# Patient Record
Sex: MALE
[De-identification: names, ages, dates, MRNs, and addresses within clinical notes are randomized per-mention and may not be internally consistent; named-entity substitution may affect disease eponyms.]

---

## 2018-08-01 ENCOUNTER — HOSPITAL (OUTPATIENT)
Dept: OTHER | Age: 83
End: 2018-08-01
Attending: OTOLARYNGOLOGY

## 2021-07-01 ENCOUNTER — ORDER TRANSCRIPTION (OUTPATIENT)
Dept: ADMINISTRATIVE | Facility: HOSPITAL | Age: 86
End: 2021-07-01

## 2021-07-01 ENCOUNTER — ORDER TRANSCRIPTION (OUTPATIENT)
Dept: PHYSICAL THERAPY | Facility: HOSPITAL | Age: 86
End: 2021-07-01

## 2021-07-01 DIAGNOSIS — Z11.59 ENCOUNTER FOR SCREENING FOR OTHER VIRAL DISEASES: ICD-10-CM

## 2021-07-01 DIAGNOSIS — Z01.818 PREOP EXAMINATION: Primary | ICD-10-CM

## 2021-07-01 DIAGNOSIS — R26.2 DIFFICULTY WALKING: Primary | ICD-10-CM

## 2021-07-17 ENCOUNTER — LAB ENCOUNTER (OUTPATIENT)
Dept: LAB | Facility: HOSPITAL | Age: 86
End: 2021-07-17
Attending: SPECIALIST
Payer: COMMERCIAL

## 2021-07-17 DIAGNOSIS — Z11.59 ENCOUNTER FOR SCREENING FOR OTHER VIRAL DISEASES: ICD-10-CM

## 2021-07-17 DIAGNOSIS — Z01.818 PREOP EXAMINATION: ICD-10-CM

## 2021-07-17 LAB — SARS-COV-2 RNA RESP QL NAA+PROBE: NOT DETECTED

## 2021-07-20 ENCOUNTER — HOSPITAL ENCOUNTER (OUTPATIENT)
Dept: GENERAL RADIOLOGY | Facility: HOSPITAL | Age: 86
Discharge: HOME | End: 2021-07-20
Attending: SPECIALIST
Payer: COMMERCIAL

## 2021-07-20 DIAGNOSIS — R13.13 PHARYNGEAL DYSPHAGIA: ICD-10-CM

## 2021-07-20 DIAGNOSIS — R13.10 DIFFICULTY SWALLOWING: ICD-10-CM

## 2021-07-20 PROCEDURE — 92611 MOTION FLUOROSCOPY/SWALLOW: CPT

## 2021-07-20 PROCEDURE — 74230 X-RAY XM SWLNG FUNCJ C+: CPT | Performed by: SPECIALIST

## 2021-07-20 NOTE — PROGRESS NOTES
ADULT VIDEOFLUOROSCOPIC SWALLOWING STUDY       ADULT VIDEOFLUOROSCOPIC SWALLOWING STUDY:   Referring Physician: Nick Simons      Radiologist: Dr. Bisi Finnegan  Diagnosis: dysphagia    Date of Service: 7/20/2021     PATIENT SUMMARY   Chief Complaint: The patient the swallow and episodes of aspiration occurred after the swallow on retention remaining.   The patient elicited a cough and throat clear inconsistently to the penetration and consistently with the aspiration, however, he was unable to eject the material fr strategies:   Sit upright  Small sips  Small bites  Eat slowly  Alternate liquids and solids  Swallow twice with each bite  Multiple dry swallows  Cough after swallow  No straw    Medication Administration:  Crush meds & add to pureed  Take whole in pureed signed by therapist: MILAN Martinez  [de-identified] certification required:  Yes  I certify the need for these services furnished under this plan of treatment and while under my care.     X___________________________________________________ Date______

## 2021-07-20 NOTE — PATIENT INSTRUCTIONS
DDiet Recommendations:  Solids: Pureed  Liquids: Honey  Pt may have small sips of plain water after cleaning his mouth thoroughly between meals, but not during meals.   Recommend trial dysphagia therapy.     Recommended compensatory strategies:   Sit Lico Crafts

## 2021-08-05 ENCOUNTER — APPOINTMENT (OUTPATIENT)
Dept: PHYSICAL THERAPY | Facility: HOSPITAL | Age: 86
End: 2021-08-05
Payer: COMMERCIAL

## 2021-08-10 ENCOUNTER — APPOINTMENT (OUTPATIENT)
Dept: PHYSICAL THERAPY | Facility: HOSPITAL | Age: 86
End: 2021-08-10
Payer: COMMERCIAL

## 2021-08-12 ENCOUNTER — APPOINTMENT (OUTPATIENT)
Dept: PHYSICAL THERAPY | Facility: HOSPITAL | Age: 86
End: 2021-08-12
Payer: COMMERCIAL

## 2021-08-16 ENCOUNTER — OFFICE VISIT (OUTPATIENT)
Dept: PHYSICAL THERAPY | Facility: HOSPITAL | Age: 86
End: 2021-08-16
Payer: COMMERCIAL

## 2021-08-16 DIAGNOSIS — R26.2 DIFFICULTY WALKING: ICD-10-CM

## 2021-08-16 PROCEDURE — 97163 PT EVAL HIGH COMPLEX 45 MIN: CPT

## 2021-08-16 PROCEDURE — 97110 THERAPEUTIC EXERCISES: CPT

## 2021-08-16 NOTE — PROGRESS NOTES
LOWER EXTREMITY EVALUATION:   Referring Physician: Dr. Fernando Valdez (fax [phone])  Diagnosis: Difficulty walking (R26.2)     Date of Service: 8/16/2021     PATIENT SUMMARY   Bran Hsieh is a 82year old male who presents to therapy today with complai Significant findings include No past medical history on file. No past surgical history on file. Stent placed: two iliac stents, aortic stent, done 2-3 years ago. On BP meds and HLD meds. No MI or CVA. Borderline DM unmedicated but monitored.  3rd and 4 seated assessment of lumbar PA globally, restricted anterior glide of knee      Flexibility:  Hip Flexor: R strong restriction, L strong restriction  Hamstrings: R strong restriction; L strong restriction  Gastroc-soleus: R strong restriction; L strong res mechanics to decrease pain and improve spinal safety   · Pt will have decreased paraspinal mm tension to tolerate standing for work and home activities   · Patient will improve 6MWT to 417m without rest breaks.     · Patient will demonstrate 5xSTS no greate

## 2021-08-18 ENCOUNTER — OFFICE VISIT (OUTPATIENT)
Dept: PHYSICAL THERAPY | Facility: HOSPITAL | Age: 86
End: 2021-08-18
Payer: COMMERCIAL

## 2021-08-18 DIAGNOSIS — R26.2 DIFFICULTY WALKING: ICD-10-CM

## 2021-08-18 PROCEDURE — 97110 THERAPEUTIC EXERCISES: CPT

## 2021-08-18 NOTE — PROGRESS NOTES
Dx: Difficulty walking (R26.2)         Insurance (Authorized # of Visits):  [facility] 2/10           Authorizing Physician: Dr. Evelyne Schwartz  Next MD visit: none scheduled  Fall Risk: high         Precautions: Cancer, Fall Risk, iliac and aortic stents, borderl 1-2 x/week or a total of 10 visits over a 90 day period.  Treatment will include: Gait training, Manual Therapy, Neuromuscular Re-education, Therapeutic Activities, Therapeutic Exercise and Home Exercise Program instruction  Date: 8/18/2021  TX#: 2/10 Date:

## 2021-08-23 ENCOUNTER — OFFICE VISIT (OUTPATIENT)
Dept: PHYSICAL THERAPY | Facility: HOSPITAL | Age: 86
End: 2021-08-23
Payer: COMMERCIAL

## 2021-08-23 DIAGNOSIS — R26.2 DIFFICULTY WALKING: ICD-10-CM

## 2021-08-23 PROCEDURE — 97112 NEUROMUSCULAR REEDUCATION: CPT

## 2021-08-23 PROCEDURE — 97110 THERAPEUTIC EXERCISES: CPT

## 2021-08-23 NOTE — PROGRESS NOTES
Dx: Difficulty walking (R26.2)         Insurance (Authorized # of Visits):  [employer] [address] [employer] PPO 3/10           Authorizing Physician: Dr. Meggan Bedolla  Next MD visit: none scheduled  Fall Risk: high         Precautions: Cancer, Fall Risk, iliac and aortic stents, borderl 90 day period.  Treatment will include: Gait training, Manual Therapy, Neuromuscular Re-education, Therapeutic Activities, Therapeutic Exercise and Home Exercise Program instruction  Date: 8/18/2021  TX#: 2/10 Date:  8/23/2021                TX#: 3/10 Date:

## 2021-08-25 ENCOUNTER — OFFICE VISIT (OUTPATIENT)
Dept: PHYSICAL THERAPY | Facility: HOSPITAL | Age: 86
End: 2021-08-25
Payer: COMMERCIAL

## 2021-08-25 DIAGNOSIS — R26.2 DIFFICULTY WALKING: ICD-10-CM

## 2021-08-25 PROCEDURE — 97110 THERAPEUTIC EXERCISES: CPT

## 2021-08-25 NOTE — PROGRESS NOTES
Dx: Difficulty walking (R26.2)         Insurance (Authorized # of Visits):  [employer] PPO 4/10           Authorizing Physician: Dr. Jody Montana  Next MD visit: none scheduled  Fall Risk: high         Precautions: Cancer, Fall Risk, iliac and aortic stents, borderlin improved lower quarter strength to ascend one flight of stairs with alternating foot pattern or reciprocal pattern.    · Pt will be independent and compliant with comprehensive HEP to maintain progress achieved in PT     Plan: progress functional strength a

## 2021-08-26 ENCOUNTER — TELEPHONE (OUTPATIENT)
Dept: PHYSICAL THERAPY | Facility: HOSPITAL | Age: 86
End: 2021-08-26

## 2021-08-30 ENCOUNTER — OFFICE VISIT (OUTPATIENT)
Dept: PHYSICAL THERAPY | Facility: HOSPITAL | Age: 86
End: 2021-08-30
Payer: COMMERCIAL

## 2021-08-30 DIAGNOSIS — R26.2 DIFFICULTY WALKING: ICD-10-CM

## 2021-08-30 NOTE — PROGRESS NOTES
Dx: Difficulty walking (R26.2)         Insurance (Authorized # of Visits):  [facility] 4/10           Authorizing Physician: Dr. Melissa Westfall  Next MD visit: none scheduled  Fall Risk: high         Precautions: Cancer, Fall Risk, iliac and aortic stents, borderl

## 2021-09-01 ENCOUNTER — OFFICE VISIT (OUTPATIENT)
Dept: SPEECH THERAPY | Facility: HOSPITAL | Age: 86
End: 2021-09-01
Attending: SPECIALIST
Payer: COMMERCIAL

## 2021-09-01 PROCEDURE — 92526 ORAL FUNCTION THERAPY: CPT

## 2021-09-01 NOTE — PROGRESS NOTES
Diagnosis: dysphagia  Authorized # of Visits:  6         Next MD visit: none scheduled  Fall Risk: standard         Precautions: Covid PPE             Subjective: Pt is not eating pureed food nor drinking moderately thick liquids as recommended.   He is eat Provided in written form, with emphasis on need for honey thick liquid unless pt cleans mouth thoroughly then he is able to rake small single sips of plain water. Encouraged cough after all PO.         Clinical signs of aspiration Pt cued to cough after ea swallow  Mendelsohn exercises  Shaker exercises  Plan: Continue therapy per specified goals.       Skilled Services: dysphagia therapy    Charges: 17422        Total Treatment Time: 54 min    Miranda Gaviria MA/CCC-SLP  Speech Language Pathologist  [facility]

## 2021-09-03 ENCOUNTER — TELEPHONE (OUTPATIENT)
Dept: PHYSICAL THERAPY | Facility: HOSPITAL | Age: 86
End: 2021-09-03

## 2021-09-08 ENCOUNTER — TELEPHONE (OUTPATIENT)
Dept: SPEECH THERAPY | Facility: HOSPITAL | Age: 86
End: 2021-09-08

## 2021-09-08 ENCOUNTER — APPOINTMENT (OUTPATIENT)
Dept: SPEECH THERAPY | Facility: HOSPITAL | Age: 86
End: 2021-09-08
Attending: SPECIALIST
Payer: COMMERCIAL

## 2021-09-08 NOTE — TELEPHONE ENCOUNTER
Pt cancelled all of his appointments because he said he was going to [place] for the winter. Attempted to call patient back (called number listed)  to encourage him to seek swallowing therapy in [place] as his dysphagia is significant.   His wife answered

## 2021-09-08 NOTE — TELEPHONE ENCOUNTER
Wife stopped into clinic. Briefly reviewed results of VFSS, diet and liquids recommendations and therapy recommendations.   Bernadette Casanova MA/[facility]-SLP  Speech Language Pathologist  [facility]  [phone]

## 2021-09-09 ENCOUNTER — OFFICE VISIT (OUTPATIENT)
Dept: PHYSICAL THERAPY | Facility: HOSPITAL | Age: 86
End: 2021-09-09
Attending: ORTHOPAEDIC SURGERY
Payer: COMMERCIAL

## 2021-09-09 PROCEDURE — 97110 THERAPEUTIC EXERCISES: CPT

## 2021-09-09 NOTE — PROGRESS NOTES
Dx: Difficulty walking (R26.2)         Insurance (Authorized # of Visits):  [facility] 8/11           Authorizing Physician: Dr. July Rush  Next MD visit: none scheduled  Fall Risk: high         Precautions: Cancer, Fall Risk, iliac and aortic stents, borderlin functional strength and gait impairments, deconditioning  Patient will be seen for 1-2 x/week or a total of 10 visits over a 90 day period.  Treatment will include: Gait training, Manual Therapy, Neuromuscular Re-education, Therapeutic Activities, Therapeut

## 2021-09-15 ENCOUNTER — APPOINTMENT (OUTPATIENT)
Dept: SPEECH THERAPY | Facility: HOSPITAL | Age: 86
End: 2021-09-15
Attending: SPECIALIST
Payer: COMMERCIAL

## 2021-09-22 ENCOUNTER — APPOINTMENT (OUTPATIENT)
Dept: SPEECH THERAPY | Facility: HOSPITAL | Age: 86
End: 2021-09-22
Attending: SPECIALIST
Payer: COMMERCIAL

## 2021-09-22 ENCOUNTER — OFFICE VISIT (OUTPATIENT)
Dept: PHYSICAL THERAPY | Facility: HOSPITAL | Age: 86
End: 2021-09-22
Attending: ORTHOPAEDIC SURGERY
Payer: COMMERCIAL

## 2021-09-22 PROCEDURE — 97110 THERAPEUTIC EXERCISES: CPT

## 2021-09-22 NOTE — PROGRESS NOTES
Dx: Difficulty walking (R26.2)         Insurance (Authorized # of Visits):  [facility] 6/10           Authorizing Physician: Dr. Marty Sherman  Next MD visit: none scheduled  Fall Risk: high         Precautions: Cancer, Fall Risk, iliac and aortic stents, borderlin and power. · Patient will demonstrate improved lower quarter strength to ascend one flight of stairs with alternating foot pattern or reciprocal pattern.    · Pt will be independent and compliant with comprehensive HEP to maintain progress achieved in PT Treatment Time: 40 min

## 2021-09-23 ENCOUNTER — APPOINTMENT (OUTPATIENT)
Dept: PHYSICAL THERAPY | Facility: HOSPITAL | Age: 86
End: 2021-09-23
Payer: COMMERCIAL

## 2021-09-27 ENCOUNTER — OFFICE VISIT (OUTPATIENT)
Dept: PHYSICAL THERAPY | Facility: HOSPITAL | Age: 86
End: 2021-09-27
Attending: ORTHOPAEDIC SURGERY
Payer: COMMERCIAL

## 2021-09-27 PROCEDURE — 97110 THERAPEUTIC EXERCISES: CPT

## 2021-09-27 NOTE — PROGRESS NOTES
Abel  Pt has attended 7 visits in Physical Therapy.      Dx: Difficulty walking (R26.2)         Insurance (Authorized # of Visits):  [employer] PPO 7/10           Authorizing Physician: Dr. Demetria Mcwilliams MD visit: none scheduled  Fall Risk: high to his winter home in the next week or two. During his time in therapy, he states feeling improvement with regards to his quality of gait, strength, sensation of leg heaviness.  He also demonstrates improvements with regards to functional tolerance as seen instruction  Date: 8/18/2021  TX#: 2/10 Date:  8/23/2021                TX#: 3/10 Date: 8/25/2021                 TX#: 4/10 Date:  9/9/2021                TX#: 5/10 Date: 9/22/2021   Tx#: 6/10  9/27/2021  7/10    TE:   Core brace training in [facility] x10, 5 care.    Thank you for your referral. If you have any questions, please contact me at Dept: [phone].     Sincerely,  Electronically signed by therapist: Betty Sanchez     Physician's certification required:  No  Please co-sign or sign and return th

## 2021-09-28 ENCOUNTER — APPOINTMENT (OUTPATIENT)
Dept: PHYSICAL THERAPY | Facility: HOSPITAL | Age: 86
End: 2021-09-28
Payer: COMMERCIAL

## 2021-09-29 ENCOUNTER — APPOINTMENT (OUTPATIENT)
Dept: SPEECH THERAPY | Facility: HOSPITAL | Age: 86
End: 2021-09-29
Attending: SPECIALIST
Payer: COMMERCIAL

## 2021-10-15 ENCOUNTER — APPOINTMENT (OUTPATIENT)
Dept: SPEECH THERAPY | Facility: HOSPITAL | Age: 86
End: 2021-10-15
Attending: SPECIALIST
Payer: COMMERCIAL

## 2022-05-06 ENCOUNTER — HOSPITAL ENCOUNTER (OUTPATIENT)
Dept: ULTRASOUND IMAGING | Facility: HOSPITAL | Age: 87
Discharge: HOME | End: 2022-05-06
Attending: SPECIALIST
Payer: COMMERCIAL

## 2022-05-06 DIAGNOSIS — I73.9 PERIPHERAL VASCULAR DISEASE, UNSPECIFIED (HCC): ICD-10-CM

## 2022-05-06 DIAGNOSIS — I70.213 ATHEROSCLER OF NATIVE ARTERY OF BOTH LEGS WITH INTERMIT CLAUDICATION (HCC): ICD-10-CM

## 2022-05-06 PROCEDURE — 93923 UPR/LXTR ART STDY 3+ LVLS: CPT | Performed by: SPECIALIST

## 2022-07-13 ENCOUNTER — HOSPITAL ENCOUNTER (EMERGENCY)
Facility: HOSPITAL | Age: 87
Discharge: HOME | End: 2022-07-13
Attending: EMERGENCY MEDICINE
Payer: COMMERCIAL

## 2022-07-13 VITALS
SYSTOLIC BLOOD PRESSURE: 140 MMHG | HEART RATE: 56 BPM | WEIGHT: 168 LBS | TEMPERATURE: 97 F | BODY MASS INDEX: 23.52 KG/M2 | OXYGEN SATURATION: 98 % | RESPIRATION RATE: 19 BRPM | DIASTOLIC BLOOD PRESSURE: 70 MMHG | HEIGHT: 71 IN

## 2022-07-13 DIAGNOSIS — R53.1 WEAKNESS GENERALIZED: Primary | ICD-10-CM

## 2022-07-13 DIAGNOSIS — U07.1 COVID-19 VIRUS INFECTION: ICD-10-CM

## 2022-07-13 LAB
ANION GAP SERPL CALC-SCNC: 6 MMOL/L (ref 0–18)
BASOPHILS # BLD AUTO: 0.04 X10(3) UL (ref 0–0.2)
BASOPHILS NFR BLD AUTO: 0.8 %
BILIRUB UR QL: NEGATIVE
BUN BLD-MCNC: 18 MG/DL (ref 7–18)
BUN/CREAT SERPL: 17.1 (ref 10–20)
CALCIUM BLD-MCNC: 8.7 MG/DL (ref 8.5–10.1)
CHLORIDE SERPL-SCNC: 108 MMOL/L (ref 98–112)
CO2 SERPL-SCNC: 26 MMOL/L (ref 21–32)
COLOR UR: YELLOW
CREAT BLD-MCNC: 1.05 MG/DL
DEPRECATED RDW RBC AUTO: 57.3 FL (ref 35.1–46.3)
EOSINOPHIL # BLD AUTO: 0.01 X10(3) UL (ref 0–0.7)
EOSINOPHIL NFR BLD AUTO: 0.2 %
ERYTHROCYTE [DISTWIDTH] IN BLOOD BY AUTOMATED COUNT: 15.7 % (ref 11–15)
GLUCOSE BLD-MCNC: 105 MG/DL (ref 70–99)
GLUCOSE UR-MCNC: NEGATIVE MG/DL
HCT VFR BLD AUTO: 30.7 %
HGB BLD-MCNC: 10.1 G/DL
HGB UR QL STRIP.AUTO: NEGATIVE
HYALINE CASTS #/AREA URNS AUTO: PRESENT /LPF
IMM GRANULOCYTES # BLD AUTO: 0.01 X10(3) UL (ref 0–1)
IMM GRANULOCYTES NFR BLD: 0.2 %
KETONES UR-MCNC: NEGATIVE MG/DL
LEUKOCYTE ESTERASE UR QL STRIP.AUTO: NEGATIVE
LYMPHOCYTES # BLD AUTO: 0.87 X10(3) UL (ref 1–4)
LYMPHOCYTES NFR BLD AUTO: 18.5 %
MCH RBC QN AUTO: 32.6 PG (ref 26–34)
MCHC RBC AUTO-ENTMCNC: 32.9 G/DL (ref 31–37)
MCV RBC AUTO: 99 FL
MONOCYTES # BLD AUTO: 0.62 X10(3) UL (ref 0.1–1)
MONOCYTES NFR BLD AUTO: 13.2 %
NEUTROPHILS # BLD AUTO: 3.16 X10 (3) UL (ref 1.5–7.7)
NEUTROPHILS # BLD AUTO: 3.16 X10(3) UL (ref 1.5–7.7)
NEUTROPHILS NFR BLD AUTO: 67.1 %
NITRITE UR QL STRIP.AUTO: NEGATIVE
OSMOLALITY SERPL CALC.SUM OF ELEC: 292 MOSM/KG (ref 275–295)
PH UR: 5 [PH] (ref 5–8)
PLATELET # BLD AUTO: 104 10(3)UL (ref 150–450)
POTASSIUM SERPL-SCNC: 3.9 MMOL/L (ref 3.5–5.1)
PROT UR-MCNC: 30 MG/DL
RBC # BLD AUTO: 3.1 X10(6)UL
SARS-COV-2 RNA RESP QL NAA+PROBE: DETECTED
SODIUM SERPL-SCNC: 140 MMOL/L (ref 136–145)
SP GR UR STRIP: 1.02 (ref 1–1.03)
UROBILINOGEN UR STRIP-ACNC: 4
VIT C UR-MCNC: 40 MG/DL
WBC # BLD AUTO: 4.7 X10(3) UL (ref 4–11)

## 2022-07-13 PROCEDURE — 96360 HYDRATION IV INFUSION INIT: CPT

## 2022-07-13 PROCEDURE — 85025 COMPLETE CBC W/AUTO DIFF WBC: CPT | Performed by: EMERGENCY MEDICINE

## 2022-07-13 PROCEDURE — 99284 EMERGENCY DEPT VISIT MOD MDM: CPT

## 2022-07-13 PROCEDURE — 80048 BASIC METABOLIC PNL TOTAL CA: CPT | Performed by: EMERGENCY MEDICINE

## 2022-07-13 PROCEDURE — 81001 URINALYSIS AUTO W/SCOPE: CPT | Performed by: EMERGENCY MEDICINE

## 2022-07-13 RX ORDER — ONDANSETRON 2 MG/ML
4 INJECTION INTRAMUSCULAR; INTRAVENOUS ONCE
Status: DISCONTINUED | OUTPATIENT
Start: 2022-07-13 | End: 2022-07-13

## 2022-07-13 NOTE — ED INITIAL ASSESSMENT (HPI)
Pt contacted PCP and was told to come to the ER due to a potential for being dehydrated and possibly having electrolytes \"off\" pt states he is weaker than normal. Recently had radiation for throat cancer.

## 2022-08-12 ENCOUNTER — TELEPHONE (OUTPATIENT)
Dept: SURGERY | Facility: CLINIC | Age: 87
End: 2022-08-12

## 2022-08-12 ENCOUNTER — OFFICE VISIT (OUTPATIENT)
Dept: SURGERY | Facility: CLINIC | Age: 87
End: 2022-08-12
Payer: COMMERCIAL

## 2022-08-12 VITALS — WEIGHT: 180 LBS | HEIGHT: 71 IN | BODY MASS INDEX: 25.2 KG/M2

## 2022-08-12 DIAGNOSIS — R63.4 WEIGHT LOSS: ICD-10-CM

## 2022-08-12 DIAGNOSIS — R63.30 FEEDING DIFFICULTIES: Primary | ICD-10-CM

## 2022-08-12 DIAGNOSIS — Z85.819 HISTORY OF OROPHARYNGEAL CANCER: ICD-10-CM

## 2022-08-12 RX ORDER — ASPIRIN 325 MG
TABLET ORAL
COMMUNITY

## 2022-08-12 RX ORDER — BICALUTAMIDE 50 MG/1
TABLET, FILM COATED ORAL
COMMUNITY
Start: 2022-08-05

## 2022-08-12 RX ORDER — DONEPEZIL HYDROCHLORIDE 10 MG/1
TABLET, FILM COATED ORAL
COMMUNITY
Start: 2022-01-04

## 2022-08-12 RX ORDER — ERGOCALCIFEROL (VITAMIN D2) 10 MCG
400 TABLET ORAL DAILY
COMMUNITY

## 2022-08-12 RX ORDER — BICALUTAMIDE 50 MG/1
TABLET, FILM COATED ORAL
COMMUNITY
Start: 2021-10-27

## 2022-08-12 NOTE — TELEPHONE ENCOUNTER
Patient's cell phone called and detailed VM left cancelling his appointment for today. Dr. Cinthia Bello is not a primary care physician.

## 2022-08-29 ENCOUNTER — HOSPITAL ENCOUNTER (EMERGENCY)
Facility: HOSPITAL | Age: 87
Discharge: HOME | End: 2022-08-29
Attending: EMERGENCY MEDICINE
Payer: COMMERCIAL

## 2022-08-29 VITALS
HEIGHT: 71 IN | WEIGHT: 174 LBS | RESPIRATION RATE: 18 BRPM | BODY MASS INDEX: 24.36 KG/M2 | HEART RATE: 75 BPM | DIASTOLIC BLOOD PRESSURE: 64 MMHG | OXYGEN SATURATION: 97 % | TEMPERATURE: 99 F | SYSTOLIC BLOOD PRESSURE: 111 MMHG

## 2022-08-29 DIAGNOSIS — Z20.822 LAB TEST NEGATIVE FOR COVID-19 VIRUS: Primary | ICD-10-CM

## 2022-08-29 LAB — SARS-COV-2 RNA RESP QL NAA+PROBE: NOT DETECTED

## 2022-08-29 PROCEDURE — 99283 EMERGENCY DEPT VISIT LOW MDM: CPT

## 2022-09-14 ENCOUNTER — OFFICE VISIT (OUTPATIENT)
Dept: SURGERY | Facility: CLINIC | Age: 87
End: 2022-09-14
Payer: COMMERCIAL

## 2022-09-14 VITALS — BODY MASS INDEX: 25 KG/M2 | WEIGHT: 181 LBS

## 2022-09-14 DIAGNOSIS — R63.30 FEEDING DIFFICULTIES: ICD-10-CM

## 2022-09-14 DIAGNOSIS — R63.4 WEIGHT LOSS: ICD-10-CM

## 2022-09-14 DIAGNOSIS — Z85.819 HISTORY OF OROPHARYNGEAL CANCER: Primary | ICD-10-CM

## 2022-09-14 PROCEDURE — 99213 OFFICE O/P EST LOW 20 MIN: CPT | Performed by: SURGERY

## 2022-09-15 ENCOUNTER — TELEPHONE (OUTPATIENT)
Dept: SURGERY | Facility: CLINIC | Age: 87
End: 2022-09-15

## 2022-09-15 ENCOUNTER — OFFICE VISIT (OUTPATIENT)
Dept: OTOLARYNGOLOGY | Facility: CLINIC | Age: 87
End: 2022-09-15
Payer: COMMERCIAL

## 2022-09-15 VITALS — TEMPERATURE: 98 F | HEIGHT: 71 IN | BODY MASS INDEX: 25.34 KG/M2 | WEIGHT: 181 LBS

## 2022-09-15 DIAGNOSIS — R13.10 DYSPHAGIA, UNSPECIFIED TYPE: Primary | ICD-10-CM

## 2022-09-15 PROCEDURE — 31575 DIAGNOSTIC LARYNGOSCOPY: CPT | Performed by: OTOLARYNGOLOGY

## 2022-09-15 PROCEDURE — 3008F BODY MASS INDEX DOCD: CPT | Performed by: OTOLARYNGOLOGY

## 2022-09-15 PROCEDURE — 99243 OFF/OP CNSLTJ NEW/EST LOW 30: CPT | Performed by: OTOLARYNGOLOGY

## 2022-09-15 NOTE — TELEPHONE ENCOUNTER
Left detailed message on VM regarding the reason the patient's wife is calling for an appointment. Dr. Matthew Serra the patient to see Dr. Ho [place] to f/u on his h/o oropharyngeal cancer prior to removal of the PEG tube.

## 2022-09-15 NOTE — PROGRESS NOTES
Established Patient Follow-up      9/15/2022    Catrachita Guillen Sr. 82year old      HPI  Patient presents with: Follow - Up: Pt arrives with wife for f/u on feeding difficulties, he is requesting PEG tube removal. The Tube was placed on 7/27/22 in [place]. Pt states his weight has been steady, he is now 175 lbs, up from 173 lbs. Appetite is fair, he has just recently had a return of smell. He is taking nutrition orally and tube is being flushed. Pt denies bowel/bladder problems. No other f/u since last visit here, ED eval for URI - neg COVID, wt today 181# with clothes, low wt was 163 just before tube placed, no abd pain or tube issues, regular BM. Exam  Abd soft and nt, long healed laparotomy scar, PEG tube dressing secure. Assessment/Plan  Assessment   History of oropharyngeal cancer  (primary encounter diagnosis)  Feeding difficulties  Weight loss    Stable and some progress, maybe returning to baseline post treatment status. H/o PEG tube placement in [address] [place] [address] in June/2022 for weight loss, previous laparotomy for complicated appendicitis. On ASA. I asked him to see Dr. Ayden Agarwal in ENT and Dr. Maximus Leach in Medical Oncology, to follow up on his h/o \"oropharyngeal cancer\", to make sure his cancer surveillance is up to date and swallowing function is adequate and safe, prior to removing his PEG tube. Office visit ok for PEG tube removal when all are in agreement, hold ASA 5 days before. Spouse and pt understand.          Fortunato Velasquez MD

## 2022-09-19 ENCOUNTER — OFFICE VISIT (OUTPATIENT)
Dept: HEMATOLOGY/ONCOLOGY | Facility: HOSPITAL | Age: 87
End: 2022-09-19
Attending: STUDENT IN AN ORGANIZED HEALTH CARE EDUCATION/TRAINING PROGRAM
Payer: COMMERCIAL

## 2022-09-19 VITALS
DIASTOLIC BLOOD PRESSURE: 61 MMHG | BODY MASS INDEX: 25.22 KG/M2 | TEMPERATURE: 99 F | RESPIRATION RATE: 16 BRPM | SYSTOLIC BLOOD PRESSURE: 115 MMHG | HEIGHT: 71 IN | OXYGEN SATURATION: 97 % | WEIGHT: 180.13 LBS | HEART RATE: 71 BPM

## 2022-09-19 DIAGNOSIS — D69.6 THROMBOCYTOPENIA (HCC): ICD-10-CM

## 2022-09-19 DIAGNOSIS — D64.9 ANEMIA, UNSPECIFIED TYPE: ICD-10-CM

## 2022-09-19 DIAGNOSIS — Z85.89 HISTORY OF HEAD AND NECK CANCER: Primary | ICD-10-CM

## 2022-09-19 LAB
BASOPHILS # BLD AUTO: 0.04 X10(3) UL (ref 0–0.2)
BASOPHILS NFR BLD AUTO: 0.9 %
DEPRECATED RDW RBC AUTO: 55.8 FL (ref 35.1–46.3)
EOSINOPHIL # BLD AUTO: 0.3 X10(3) UL (ref 0–0.7)
EOSINOPHIL NFR BLD AUTO: 6.8 %
ERYTHROCYTE [DISTWIDTH] IN BLOOD BY AUTOMATED COUNT: 15.4 % (ref 11–15)
FOLATE SERPL-MCNC: 18.7 NG/ML (ref 8.7–?)
HCT VFR BLD AUTO: 33.3 %
HGB BLD-MCNC: 10.9 G/DL
IMM GRANULOCYTES # BLD AUTO: 0.02 X10(3) UL (ref 0–1)
IMM GRANULOCYTES NFR BLD: 0.5 %
LYMPHOCYTES # BLD AUTO: 1.01 X10(3) UL (ref 1–4)
LYMPHOCYTES NFR BLD AUTO: 22.9 %
MCH RBC QN AUTO: 32.4 PG (ref 26–34)
MCHC RBC AUTO-ENTMCNC: 32.7 G/DL (ref 31–37)
MCV RBC AUTO: 99.1 FL
MONOCYTES # BLD AUTO: 0.41 X10(3) UL (ref 0.1–1)
MONOCYTES NFR BLD AUTO: 9.3 %
NEUTROPHILS # BLD AUTO: 2.63 X10 (3) UL (ref 1.5–7.7)
NEUTROPHILS # BLD AUTO: 2.63 X10(3) UL (ref 1.5–7.7)
NEUTROPHILS NFR BLD AUTO: 59.6 %
PLATELET # BLD AUTO: 158 10(3)UL (ref 150–450)
RBC # BLD AUTO: 3.36 X10(6)UL
T4 FREE SERPL-MCNC: 0.7 NG/DL (ref 0.8–1.7)
TSI SER-ACNC: 3.92 MIU/ML (ref 0.36–3.74)
VIT B12 SERPL-MCNC: 980 PG/ML (ref 193–986)
WBC # BLD AUTO: 4.4 X10(3) UL (ref 4–11)

## 2022-09-19 PROCEDURE — 85025 COMPLETE CBC W/AUTO DIFF WBC: CPT | Performed by: STUDENT IN AN ORGANIZED HEALTH CARE EDUCATION/TRAINING PROGRAM

## 2022-09-19 PROCEDURE — 84439 ASSAY OF FREE THYROXINE: CPT | Performed by: STUDENT IN AN ORGANIZED HEALTH CARE EDUCATION/TRAINING PROGRAM

## 2022-09-19 PROCEDURE — 84443 ASSAY THYROID STIM HORMONE: CPT | Performed by: STUDENT IN AN ORGANIZED HEALTH CARE EDUCATION/TRAINING PROGRAM

## 2022-09-19 PROCEDURE — 82607 VITAMIN B-12: CPT | Performed by: STUDENT IN AN ORGANIZED HEALTH CARE EDUCATION/TRAINING PROGRAM

## 2022-09-19 PROCEDURE — 82746 ASSAY OF FOLIC ACID SERUM: CPT | Performed by: STUDENT IN AN ORGANIZED HEALTH CARE EDUCATION/TRAINING PROGRAM

## 2022-09-19 PROCEDURE — 85060 BLOOD SMEAR INTERPRETATION: CPT | Performed by: STUDENT IN AN ORGANIZED HEALTH CARE EDUCATION/TRAINING PROGRAM

## 2022-09-19 PROCEDURE — 99211 OFF/OP EST MAY X REQ PHY/QHP: CPT

## 2022-10-04 ENCOUNTER — HOSPITAL ENCOUNTER (OUTPATIENT)
Dept: GENERAL RADIOLOGY | Facility: HOSPITAL | Age: 87
Discharge: HOME | End: 2022-10-04
Attending: OTOLARYNGOLOGY
Payer: COMMERCIAL

## 2022-10-04 DIAGNOSIS — R13.10 DYSPHAGIA, UNSPECIFIED TYPE: ICD-10-CM

## 2022-10-04 PROCEDURE — 92611 MOTION FLUOROSCOPY/SWALLOW: CPT

## 2022-10-04 PROCEDURE — 74230 X-RAY XM SWLNG FUNCJ C+: CPT | Performed by: OTOLARYNGOLOGY

## 2022-10-05 ENCOUNTER — TELEPHONE (OUTPATIENT)
Dept: SURGERY | Facility: CLINIC | Age: 87
End: 2022-10-05

## 2022-10-06 ENCOUNTER — TELEPHONE (OUTPATIENT)
Dept: SURGERY | Facility: CLINIC | Age: 87
End: 2022-10-06

## 2022-10-06 NOTE — TELEPHONE ENCOUNTER
Pt's wife is at [facility]  asking if Dr Jasmyn Fernandez review his test result from his swallow study.   Needs  Treatment plan

## 2022-10-06 NOTE — TELEPHONE ENCOUNTER
Informed patient that Dr. Elvira Clark needs to speak to Dr. Liz Diaz and then we will reach out to them with treatment plan. She verbalized understanding and wait to hear from Gen Surg Dept.

## 2022-10-07 ENCOUNTER — TELEPHONE (OUTPATIENT)
Dept: SURGERY | Facility: CLINIC | Age: 87
End: 2022-10-07

## 2022-10-12 ENCOUNTER — TELEPHONE (OUTPATIENT)
Dept: ORTHOPEDICS CLINIC | Facility: CLINIC | Age: 87
End: 2022-10-12

## 2022-10-12 ENCOUNTER — OFFICE VISIT (OUTPATIENT)
Dept: SURGERY | Facility: CLINIC | Age: 87
End: 2022-10-12
Payer: COMMERCIAL

## 2022-10-12 VITALS — BODY MASS INDEX: 25 KG/M2 | WEIGHT: 176 LBS

## 2022-10-12 DIAGNOSIS — R63.30 FEEDING DIFFICULTIES: Primary | ICD-10-CM

## 2022-10-12 DIAGNOSIS — Z85.819 HISTORY OF OROPHARYNGEAL CANCER: ICD-10-CM

## 2022-10-12 PROCEDURE — 99213 OFFICE O/P EST LOW 20 MIN: CPT | Performed by: SURGERY

## 2022-10-12 NOTE — TELEPHONE ENCOUNTER
Pt was referred by Dr Diomedes Andino to orthopedic dept for \"a bone protruding through hip/buttock area.   Please call wife to advise.  (pt leaving for [facility] 10/22/22 for 6 months)

## 2022-10-12 NOTE — TELEPHONE ENCOUNTER
S/w pt spouse and she states she was at Dr Alma Lebron office today for another issue and he showed the Dr His left hip bone, looks like it was protruding. She denies any opening in the skin, injury, redness, warmth, pain, swelling. She states pt recently lost a lot of weight. She states they just wanted to see an ortho to make sure there weren't any concerns. I advised her orthos are scheduling 4 wks out, and unfortunately cannot get pt an appt prior to them leaving on 10/22. I advised if any concerns or pain/swelling they can be evaluated at UC/pcp . She verbalized understanding.

## 2023-04-27 ENCOUNTER — TELEPHONE (OUTPATIENT)
Dept: OTOLARYNGOLOGY | Facility: CLINIC | Age: 88
End: 2023-04-27

## 2023-04-27 ENCOUNTER — ORDER TRANSCRIPTION (OUTPATIENT)
Dept: PHYSICAL THERAPY | Facility: HOSPITAL | Age: 88
End: 2023-04-27

## 2023-04-27 ENCOUNTER — OFFICE VISIT (OUTPATIENT)
Dept: OTOLARYNGOLOGY | Facility: CLINIC | Age: 88
End: 2023-04-27

## 2023-04-27 DIAGNOSIS — R13.10 DYSPHAGIA, UNSPECIFIED TYPE: Primary | ICD-10-CM

## 2023-04-27 PROCEDURE — 99213 OFFICE O/P EST LOW 20 MIN: CPT | Performed by: OTOLARYNGOLOGY

## 2023-04-27 RX ORDER — GLYCOPYRROLATE 1 MG/1
1 TABLET ORAL 3 TIMES DAILY
Qty: 90 TABLET | Refills: 1 | Status: SHIPPED | OUTPATIENT
Start: 2023-04-27

## 2023-04-27 NOTE — TELEPHONE ENCOUNTER
Patient wife is calling in regards to Prescription that was prescribed today by Dr Zapata Ohs would like to know if medication has been sent to the pharmacy.

## 2023-04-27 NOTE — TELEPHONE ENCOUNTER
Patient will need to have a video swallow completed prior to starting speech therapy. Order received from Dr. Celeste Horowitz and instructions given to patient's wife.

## 2023-04-28 NOTE — TELEPHONE ENCOUNTER
90 day prescription request for     GLYCOYRROLATE 1 MG TABLETS    TAKE 1 TABLET 1 MG BY MOUTH THREE TIMES A DAY

## 2023-05-03 NOTE — TELEPHONE ENCOUNTER
90 day supply request for Glycoyrrolate 1mg.     This refill request is being sent to the provider for the following reason:  []Patient has not had an appointment within the past 12 months but has made an appointment on: ___  [x]Medication is not within protocol  []Patient did not complete follow up recommendations  []Other: ___

## 2023-05-08 RX ORDER — GLYCOPYRROLATE 1 MG/1
1 TABLET ORAL 3 TIMES DAILY
Qty: 90 TABLET | Refills: 2 | Status: SHIPPED | OUTPATIENT
Start: 2023-05-08

## 2023-06-24 ENCOUNTER — TELEPHONE (OUTPATIENT)
Dept: OTOLARYNGOLOGY | Facility: CLINIC | Age: 88
End: 2023-06-24

## 2023-07-25 ENCOUNTER — HOSPITAL ENCOUNTER (OUTPATIENT)
Dept: BONE DENSITY | Facility: HOSPITAL | Age: 88
Discharge: HOME | End: 2023-07-25
Attending: SPECIALIST
Payer: COMMERCIAL

## 2023-07-25 DIAGNOSIS — Z78.0 ASYMPTOMATIC MENOPAUSAL STATE: ICD-10-CM

## 2023-07-25 PROCEDURE — 77080 DXA BONE DENSITY AXIAL: CPT | Performed by: SPECIALIST

## 2023-08-22 ENCOUNTER — HOSPITAL ENCOUNTER (OUTPATIENT)
Dept: GENERAL RADIOLOGY | Facility: HOSPITAL | Age: 88
Discharge: HOME | End: 2023-08-22
Attending: OTOLARYNGOLOGY
Payer: COMMERCIAL

## 2023-08-22 DIAGNOSIS — R13.10 DYSPHAGIA, UNSPECIFIED TYPE: ICD-10-CM

## 2023-08-22 PROCEDURE — 92611 MOTION FLUOROSCOPY/SWALLOW: CPT

## 2023-08-22 PROCEDURE — 74230 X-RAY XM SWLNG FUNCJ C+: CPT | Performed by: OTOLARYNGOLOGY

## 2023-08-22 NOTE — PROGRESS NOTES
ADULT VIDEOFLUOROSCOPIC SWALLOWING STUDY    Admission Date: 8/22/2023  Evaluation Date: 08/22/23  Radiologist: Dr Sumanth Mcneil / RECOMMENDATIONS     Overall Impression: Chronic Moderate Pharyngeal Dysphagia, similar to as previously seen on VFSS 10/04/2022, 2/2 presence of large anterior cervical osteophytes (?DISH syndrome) resulting in absent epiglottic inversion, decreased laryngeal-vestibular closure, decreased laryngeal elevation/excursion of swallow, and decreased base of tongue squeeze/retraction. Overall swallow efficiency and safety is decreased as increased bolus residuals observed, greater with advanced solid textures, as well as consistent deep laryngeal penetration across all textures to the vocal cords, however no evidence of tracheal aspiration observed suspect due to excellent vocal cord closure during the swallow for airway protection. Liquid assist with solid texture trials as well as multiple swallows were effective in clearing of pharyngeal residuals and penetrated material.  Again, there was no evidence of tracheal aspiration observed within scope of this study, although deep laryngeal penetration with near aspiration observed consistently. Patient compensatory swallow strategies were effective in clearing laryngeal penetration. This swallow study was similar to previous swallow studies in 2022 and 2021. Upon completion of this study, patient and spouse were educated on results. Patient/spouse confirmed that no respiratory issues or concerns were noted since consuming oral intake. Educated patient/spouse on pillars of pneumonia, including importance of maintaining good oral care/hygiene daily and daily ambulatory activity to promote good pulmonary health and reduce risk for aspiration pneumonia. Patient/spouse expressed good understanding and to follow up with physician(s) as directed. Diet Recommendations - Solids: Soft/ Easy to chew  Diet Recommendations - Liquids:  Thin Liquids    Compensatory Strategies Recommended:  (Liquid wash; throat clear as needed and re-swallow)    Aspiration Precautions: Upright position    Medication Administration Recommendations: Crushed in puree, if not contraindicated      Further Follow-up:  Treatment Plan/Recommendations:  Consider refer to tertiary care; Ortho versus Neuro Surgery evaluation and tx options for ? DISH. Consider cervical CT soft tissue neck. May consider outpatient SLP for dysphagia education/training on compensatory strategies and pillars of pneumonia    HISTORY   Background/Objective Information:  Patient was referred for this repeat OP VFSS per ENT due to persisting c/o dysphagia and upcoming OP SLP appointment. Patient was seen for previous OP VFSS on 10/04/2022 and 07/20/2021 - please see those reports for details. Patient is consuming all nutrition and hydration via PO consisting of softer texture solids and thin liquids (4 Boosts a day) and is maintaining his weight. No recent or previous hx of pneumonia or respiratory illness. Patient had a PEG placed in July 2022 due to increasing dysphagia and losing weight. Patient clinically improved in nutritional and swallow ability and ultimately had PEG removed on 10/12/2022. 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 He underwent transoral pharyngectomy and left neck dissection 4/7/2020. Pathology showed no primary cancer and the patient had 3 out of 36 lymph nodes positive with no evidence of extracapsular extension. He then received 6 weeks of adjuvant proton radiation that he completed in May 2020. He [facility] and last imaging PET/CT 5/5/2021 was negative for recurrent or metastatic disease. Patient accompanied to this study by his wife. Problem List  Active Problems:    * No active hospital problems.  *      Past Medical History  Past Medical History:   Diagnosis Date    Ascending aortic aneurysm ([address]     Basal cell carcinoma (BCC) of skin of nose     BPH (benign prostatic hyperplasia)     Cognitive disorder     Congestive heart disease ([facility]     Coronary artery disease     COVID-19 virus infection 07/13/2022    Depression     Essential hypertension     History of radiation to head and neck region 2020    proton therapy    Oropharyngeal cancer ([facility] 03/2020    met to L neck       Current Diet Consistency: Soft/ Easy to Chew; Thin liquids - patient reported consuming soft foods like mashed potatoes, pasta, cooked veggies, pancakes, etc.   Prior Level of Function: Independent  Prior Living Situation: Home with spouse  History of Recent: No recent respiratory difficulty       Reason for Referral: Other (Comment) (Persisting c/o dysphagia) Repeat eval prior to start of OP SLP    Family/Patient Goals: \"I am fine. I am 82years old and doing pretty good. I want to get back down to [place]\"    ASSESSMENT   DYSPHAGIA ASSESSMENT  Test completed in conjunction with Radiologist.  Patient Positioned: Upright;Midline;Standard Chair. Patient Viewed: Lateral.   .  Consistencies Presented: Thin liquids;Puree;Hard solid to assess oropharyngeal swallow function and assess for compensatory strategies to improve safe swallow function. THIN LIQUIDS  Method of Presentation: Teaspoon;Cup  Oral Phase of Swallow (VFSS - Thin Liquids): Within Functional Limits  Residue Severity, Location: Mild/Mod; Throughout pharynx  Cleared/Reduced with: Multiple swallows  Laryngeal Penetration: During the swallow  Tracheal Aspiration: None  Cough/Throat Clear Response: Yes  Cough/Throat Clear Effective: Yes        PUREE  Oral Phase of Swallow (VFSS - Puree): Within Functional Limits  Residue Severity, Location: Mod/Severe;Valleculae; Throughout pharynx  Cleared/Reduced with: Multiple swallows; Liquid assist  Laryngeal Penetration: During the swallow  Tracheal Aspiration: None  Cough/Throat Clear Response: Yes  Cough/Throat Clear Effective: Yes     HARD SOLID  Oral Phase of Swallow (VFSS - Hard Solid): Within Functional Limits  Residue Severity, Location: Severe;Valleculae  Cleared/Reduced with: Liquid assist;Multiple swallows  Laryngeal Penetration: During the swallow  Tracheal Aspiration: None  Cough/Throat Clear Response: Yes  Cough/Throat Clear Effective: Yes  Penetration Aspiration Scale Score: Score 4: Material enters the airway, contacts the vocal folds, and is ejected from the airway               EDUCATION/INSTRUCTION  Reviewed results and recommendations with patient/family. Agreement/Understanding verbalized and all questions answered to their apparent satisfaction. INTERDISCIPLINARY COMMUNICATION  Reviewed results with Radiologist; agreement verbalized. FOLLOW UP  Treatment Plan/Recommendations:  (referral to tertiary care; Ortho versus Neuro Surgery evaluation and tx options for ? DISH, if patient desires). Follow-up with outpatient SLP for education/training in compensatory strategies, aspiration precautions, Pillars of Pneumonia, and overall counseling regarding dysphagia.         Thank you for your referral.   If you have any questions, please contact Brandi Blue, [address], [facility]-SLP/L, pager [phone]

## 2023-09-19 ENCOUNTER — OFFICE VISIT (OUTPATIENT)
Dept: HEMATOLOGY/ONCOLOGY | Facility: HOSPITAL | Age: 88
End: 2023-09-19
Attending: STUDENT IN AN ORGANIZED HEALTH CARE EDUCATION/TRAINING PROGRAM
Payer: COMMERCIAL

## 2023-09-19 VITALS
WEIGHT: 174.19 LBS | HEART RATE: 70 BPM | TEMPERATURE: 98 F | HEIGHT: 71 IN | BODY MASS INDEX: 24.39 KG/M2 | RESPIRATION RATE: 20 BRPM | DIASTOLIC BLOOD PRESSURE: 40 MMHG | OXYGEN SATURATION: 100 % | SYSTOLIC BLOOD PRESSURE: 78 MMHG

## 2023-09-19 DIAGNOSIS — Z85.89 HISTORY OF HEAD AND NECK CANCER: Primary | ICD-10-CM

## 2023-09-19 PROCEDURE — 99213 OFFICE O/P EST LOW 20 MIN: CPT | Performed by: STUDENT IN AN ORGANIZED HEALTH CARE EDUCATION/TRAINING PROGRAM

## 2023-10-09 ENCOUNTER — OFFICE VISIT (OUTPATIENT)
Dept: INTERNAL MEDICINE CLINIC | Facility: CLINIC | Age: 88
End: 2023-10-09

## 2023-10-09 VITALS
WEIGHT: 170.38 LBS | HEART RATE: 62 BPM | HEIGHT: 69.6 IN | SYSTOLIC BLOOD PRESSURE: 98 MMHG | BODY MASS INDEX: 24.67 KG/M2 | DIASTOLIC BLOOD PRESSURE: 56 MMHG | TEMPERATURE: 99 F | OXYGEN SATURATION: 97 %

## 2023-10-09 DIAGNOSIS — Z76.89 ENCOUNTER TO ESTABLISH CARE: Primary | ICD-10-CM

## 2023-10-09 RX ORDER — MULTIVITAMIN WITH IRON
100 TABLET ORAL DAILY
COMMUNITY

## 2023-10-09 RX ORDER — MULTIVIT WITH MINERALS/LUTEIN
1000 TABLET ORAL DAILY
COMMUNITY

## 2023-10-09 RX ORDER — MIRABEGRON 25 MG/1
25 TABLET, FILM COATED, EXTENDED RELEASE ORAL DAILY
COMMUNITY
Start: 2023-09-30

## 2023-10-09 RX ORDER — TAMSULOSIN HYDROCHLORIDE 0.4 MG/1
0.8 CAPSULE ORAL EVERY MORNING
COMMUNITY
Start: 2023-05-14

## 2023-10-09 RX ORDER — CHOLECALCIFEROL (VITAMIN D3) 125 MCG
500 CAPSULE ORAL DAILY
COMMUNITY

## 2023-10-09 RX ORDER — MIRTAZAPINE 15 MG/1
15 TABLET, FILM COATED ORAL NIGHTLY
COMMUNITY
Start: 2023-06-20

## 2023-10-09 RX ORDER — IBUPROFEN 800 MG/1
800 TABLET ORAL EVERY 6 HOURS PRN
COMMUNITY
Start: 2023-09-21

## 2023-10-09 RX ORDER — CLINDAMYCIN HYDROCHLORIDE 300 MG/1
300 CAPSULE ORAL 3 TIMES DAILY
COMMUNITY
Start: 2023-09-09

## 2023-10-09 RX ORDER — ASPIRIN 81 MG/1
81 TABLET ORAL DAILY
COMMUNITY

## 2023-10-09 RX ORDER — LISINOPRIL 5 MG/1
5 TABLET ORAL DAILY
COMMUNITY
Start: 2023-10-07

## 2023-10-09 RX ORDER — ROSUVASTATIN CALCIUM 20 MG/1
20 TABLET, COATED ORAL DAILY
COMMUNITY
Start: 2023-07-20

## 2024-09-19 ENCOUNTER — APPOINTMENT (OUTPATIENT)
Dept: HEMATOLOGY/ONCOLOGY | Facility: HOSPITAL | Age: 89
End: 2024-09-19
Attending: STUDENT IN AN ORGANIZED HEALTH CARE EDUCATION/TRAINING PROGRAM
Payer: COMMERCIAL

## 2024-09-23 ENCOUNTER — OFFICE VISIT (OUTPATIENT)
Dept: HEMATOLOGY/ONCOLOGY | Facility: HOSPITAL | Age: 89
End: 2024-09-23
Attending: STUDENT IN AN ORGANIZED HEALTH CARE EDUCATION/TRAINING PROGRAM
Payer: COMMERCIAL

## 2024-09-23 VITALS
OXYGEN SATURATION: 98 % | SYSTOLIC BLOOD PRESSURE: 130 MMHG | TEMPERATURE: 98 F | HEART RATE: 57 BPM | WEIGHT: 163.81 LBS | DIASTOLIC BLOOD PRESSURE: 70 MMHG | BODY MASS INDEX: 22.93 KG/M2 | HEIGHT: 71 IN | RESPIRATION RATE: 16 BRPM

## 2024-09-23 DIAGNOSIS — Z85.89 HISTORY OF HEAD AND NECK CANCER: Primary | ICD-10-CM

## 2024-09-23 PROCEDURE — 99213 OFFICE O/P EST LOW 20 MIN: CPT | Performed by: STUDENT IN AN ORGANIZED HEALTH CARE EDUCATION/TRAINING PROGRAM

## 2024-09-24 NOTE — PROGRESS NOTES
[facility]  Oncology Clinic Progress Note    Patient Name: Kevin Domínguez Sr.   YOB: 1935   Medical Record Number: [id number]    Reason for followup: Hx of p16 positive SCC or the oropharynx    Subjective:   Kevin Domínguez Sr. is a 89 year old male with a history of BPH, hypertension, depression and a history of oropharyngeal cancer that presents for yearly medical oncology follow-up regarding history of head and neck cancer. Briefly, the patient had CT neck in March 2020 for left cervical lymphadenopathy which showed enlarged left level 2 and level 3 lymph nodes.  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  PET/CT showed hypermetabolic uptake in the left level 2 and level 3 cervical lymph nodes with no evidence of metastatic disease and no evidence of a clear primary base of tongue or oropharyngeal tumor.  Given no clear evidence of a primary tumor, he went for a transoral pharyngectomy and left neck dissection 4/7/2020.  Pathology showed no primary cancer and the patient had 3 out of 36 lymph nodes positive with no evidence of extracapsular extension.  He then received 6 weeks of adjuvant radiation that he completed in May 2020. He followed routinely at [facility] and last imaging PET/CT 5/5/2021 was negative for recurrent or metastatic disease.      The patient splits his time between the [place] area and [place].  He follows with oncology and ENT in [place] and follows with a primary care physician in [place], Dr. Lima.  Clinically he is stable, lost a few more pounds but admits to lower appetite denies any localizing symptoms.  Overall doing reasonably well.    Review of Systems:  Hematology/Oncology ROS performed and negative except as above in HPI    History/Other:     Current Medications:   tamsulosin 0.4 MG Oral Cap Take 2 capsules (0.8 mg total) by mouth every morning.      mirtazapine 15 MG Oral Tab Take 1 tablet (15  mg total) by mouth nightly.      rosuvastatin 20 MG Oral Tab Take 1 tablet (20 mg total) by mouth daily.      MYRBETRIQ 25 MG Oral Tablet 24 Hr Take 1 tablet (25 mg total) by mouth daily.      aspirin 81 MG Oral Tab EC Take 1 tablet (81 mg total) by mouth daily.      Ascorbic Acid (VITAMIN C) 1000 MG Oral Tab Take 1 tablet (1,000 mg total) by mouth daily.      pyridoxine 100 MG Oral Tab Take 1 tablet (100 mg total) by mouth daily.      cyanocobalamin 500 MCG Oral Tab Take 1 tablet (500 mcg total) by mouth daily.      ibuprofen 800 MG Oral Tab Take 1 tablet (800 mg total) by mouth every 6 (six) hours as needed for Pain.      Cholecalciferol 25 MCG (1000 UT) Oral Tab Take 1 tablet (1,000 Units total) by mouth daily.      donepezil 10 MG Oral Tab Take 1 tablet (10 mg total) by mouth nightly.       Allergies:   Allergies   Allergen Reactions    Penicillins ITCHING    Sulfa Antibiotics SWELLING       Objective:   Blood pressure 130/70, pulse 57, temperature 97.7 °F (36.5 °C), temperature source Oral, resp. rate 16, height 1.803 m (5' 11\"), weight 74.3 kg (163 lb 12.8 oz), SpO2 98%.  Physical Exam:  ECOG PS: 1  General: A&Ox3, NAD  HEENT: PERRL, OP clear, L neck surgical scar, no palpable LAD  CV: RRR, no murmurs, + pulses  Pulm: normal effort  Lymph: no palpable lymphadenopathy throughout the cervical, supraclavicular, axillary regions  Extremities: no edema  Neurological: grossly intact    Results:   Labs:  Lab Results   Component Value Date    WBC 4.4 09/19/2022    HGB 10.9 (L) 09/19/2022    HCT 33.3 (L) 09/19/2022    .0 09/19/2022    CREATSERUM 1.05 07/13/2022    BUN 18 07/13/2022     07/13/2022    K 3.9 07/13/2022     07/13/2022    CO2 26.0 07/13/2022     (H) 07/13/2022    CA 8.7 07/13/2022    T4F 0.7 (L) 09/19/2022    TSH 3.920 (H) 09/19/2022    B12 980 09/19/2022     Assessment & Plan:   Kevin Domínguez Sr. is a 89 year old male with a history of BPH, hypertension, depression and a  history of oropharyngeal cancer that presents for yearly medical oncology follow-up regarding history of head and neck cancer diagnosed and treated in 2020.    Clinically doing fairly well, no concerns for cancer recurrence.  No role for additional imaging.  Recommend continue to follow-up with the ENT for fiberoptic exams.  He follows with ENT and oncology in [place] and given that he now only requires yearly follow-up we will hold off on scheduling additional follow-up in our center but we remain available should any needs or issues arise in the future.    MDM: Juan Jose Gant DO    [facility]  [place] [facility]    This note was created using a voice-recognition transcribing system. Incorrect words or phrases may have been missed during proofreading. Please interpret accordingly.

## 2025-06-16 ENCOUNTER — MED REC SCAN ONLY (OUTPATIENT)
Dept: INTERNAL MEDICINE CLINIC | Facility: CLINIC | Age: 89
End: 2025-06-16

## (undated) NOTE — LETTER
Vinicius Beltran Md  [facility], [address]  [place]       09/16/22        Patient: Khadijah Chen. YOB: 1935   Date of Visit: 9/15/2022       Dear  Dr. Ashutosh Crawford MD,      Thank you for referring John Jaimessoham Mensah to my practice. Please find my assessment and plan below. ASSESSMENT AND PLAN    1. Dysphagia, unspecified type  History of base of tongue squamous cell carcinoma treated with proton therapy at [facility] several years ago. Since then he has developed swallowing issues which have slowly resolved over the years. At this time he states that he is taking a full diet by mouth and no longer using his G-tube. Wishes to have the G-tube out therefore I did recommend that we have him undergo a swallow study to evaluate his swallow function and if normal G-tube can be removed without difficulty. Laryngoscopy performed in the office today reveals no significant abnormalities anatomically. Return to see me after swallow study. Sincerely,   Lewis Ghosh. Maite Sanchez MD   [address] [place] [id number]    Document electronically generated by:  Lewis Ghosh.  Maite Sanchez MD

## (undated) NOTE — LETTER
Patient Name: Ihsan Gordon  YOB: 1935          MRN number:  [id number]  Date:  7/20/2021  Referring Physician: Raquel Torres    ADULT VIDEOFLUOROSCOPIC SWALLOWING STUDY:    Referring Physician: Demetria Loredo      Radiologist: Dr. Niesha Mckenzie thick and thin liquids, and intermittent aspiration with thin liquids. Penetration occurred during the swallow and episodes of aspiration occurred after the swallow on retention remaining.   The patient elicited a cough and throat clear inconsistently to t Medication Administration:  Crush meds & add to pureed  Take whole in pureed    Further Follow-up:  Recommend 6 sessions of dysphagia therapy.     GOALS (to be met 6 visits)  • The patient will tolerate puree diet consistency and honey thick liquids withou my care.     X___________________________________________________ Date____________________    Certification From: 5/01/2522  To:10/18/2021

## (undated) NOTE — LETTER
Patient Name: Fernanda Peters  YOB: 1935          MRN number:  [id number]  Date:  8/16/2021  Referring Physician:  Physician Nonstaff         LOWER EXTREMITY EVALUATION:   Referring Physician: Dr. Amy Robles (fax [phone])  Diagnosis: Diffic been. Has a SPC but does not use. Pt goals include improve strength and functional tolerance. Past medical history was reviewed with Maurice Reza. Significant findings include No past medical history on file. No past surgical history on file.      Stent pamela -10; L -7  ER: R WNL; L WNL  IR: R 15; L 20 Flexion: R WNL; L WNL  Extension: R -7; L -4    DF: R -5; L 0       Accessory motion: restricted to seated assessment of lumbar PA globally, restricted anterior glide of knee      Flexibility:  Hip Flexor: R Viviana Galdamez without requiring verbal or tactile cuing to promote advancement of therex   · Pt will demonstrate good understanding of proper posture and body mechanics to decrease pain and improve spinal safety   · Pt will have decreased paraspinal mm tension to tolera

## (undated) NOTE — LETTER
08/14/22        Patient: Camille Officer. YOB: 1935   Date of Visit: 8/12/2022       Dear  Dr. Jensen Velasquez,      Thank you for referring Betty Ramsay Sr. to my practice. Please find my assessment and plan below. Feeding difficulties  (primary encounter diagnosis)  Weight loss  History of oropharyngeal cancer    82year old male with a h/o head/neck cancer s/p tongue base resection and L neck LN dissection and radiation tx in 2020. Some cognitive concerns have been documented. All details not clear, but had PEG tube placed for weight loss in [address] [place] [address] several weeks ago. Wt is up a bit and pt wants tube removed. On ASA. Discussed in detail with patient and spouse and son, options reviewed. I encouraged routine PEG tube care per GI instructions, use it or take po for nutrition, continue to monitor weekly weights to document return to baseline weight and maintain. The tube should not be removed until it's seal to the abdominal wall is completely healed - at least 6 weeks. He should have a complete head/neck eval, surgeon and radiation tx, to ensure adequate and safe function and for cancer surveillance. Abnorma swallow study here one y/ago. Follow up here in one month to track progress. Check records from [address] [place] [address].             Sincerely,   Marcus Green MD   [address] [id number]    Document electronically generated by:  Marcus Green MD